# Patient Record
Sex: FEMALE | Race: WHITE | NOT HISPANIC OR LATINO | Employment: OTHER | URBAN - METROPOLITAN AREA
[De-identification: names, ages, dates, MRNs, and addresses within clinical notes are randomized per-mention and may not be internally consistent; named-entity substitution may affect disease eponyms.]

---

## 2021-11-30 ENCOUNTER — APPOINTMENT (OUTPATIENT)
Dept: RADIOLOGY | Facility: MEDICAL CENTER | Age: 86
DRG: 871 | End: 2021-11-30
Attending: EMERGENCY MEDICINE
Payer: MEDICARE

## 2021-11-30 ENCOUNTER — HOSPITAL ENCOUNTER (INPATIENT)
Facility: MEDICAL CENTER | Age: 86
LOS: 1 days | DRG: 871 | End: 2021-12-01
Attending: EMERGENCY MEDICINE | Admitting: HOSPITALIST
Payer: MEDICARE

## 2021-11-30 DIAGNOSIS — J18.9 PNEUMONIA OF RIGHT LOWER LOBE DUE TO INFECTIOUS ORGANISM: ICD-10-CM

## 2021-11-30 DIAGNOSIS — R53.1 GENERALIZED WEAKNESS: ICD-10-CM

## 2021-11-30 DIAGNOSIS — R50.9 FEVER, UNSPECIFIED FEVER CAUSE: ICD-10-CM

## 2021-11-30 DIAGNOSIS — R53.1 WEAKNESS: ICD-10-CM

## 2021-11-30 DIAGNOSIS — R05.9 COUGH: ICD-10-CM

## 2021-11-30 DIAGNOSIS — J12.82 PNEUMONIA DUE TO COVID-19 VIRUS: ICD-10-CM

## 2021-11-30 DIAGNOSIS — U07.1 PNEUMONIA DUE TO COVID-19 VIRUS: ICD-10-CM

## 2021-11-30 LAB
ALBUMIN SERPL BCP-MCNC: 3.7 G/DL (ref 3.2–4.9)
ALBUMIN/GLOB SERPL: 1.3 G/DL
ALP SERPL-CCNC: 118 U/L (ref 30–99)
ALT SERPL-CCNC: 13 U/L (ref 2–50)
ANION GAP SERPL CALC-SCNC: 12 MMOL/L (ref 7–16)
ANISOCYTOSIS BLD QL SMEAR: ABNORMAL
AST SERPL-CCNC: 26 U/L (ref 12–45)
BASOPHILS # BLD AUTO: 0 % (ref 0–1.8)
BASOPHILS # BLD: 0 K/UL (ref 0–0.12)
BILIRUB SERPL-MCNC: 1.5 MG/DL (ref 0.1–1.5)
BUN SERPL-MCNC: 15 MG/DL (ref 8–22)
CALCIUM SERPL-MCNC: 8.6 MG/DL (ref 8.4–10.2)
CHLORIDE SERPL-SCNC: 98 MMOL/L (ref 96–112)
CO2 SERPL-SCNC: 27 MMOL/L (ref 20–33)
CREAT SERPL-MCNC: 0.67 MG/DL (ref 0.5–1.4)
CRP SERPL HS-MCNC: 8.72 MG/DL (ref 0–0.75)
EOSINOPHIL # BLD AUTO: 0 K/UL (ref 0–0.51)
EOSINOPHIL NFR BLD: 0 % (ref 0–6.9)
ERYTHROCYTE [DISTWIDTH] IN BLOOD BY AUTOMATED COUNT: 69 FL (ref 35.9–50)
GLOBULIN SER CALC-MCNC: 2.9 G/DL (ref 1.9–3.5)
GLUCOSE SERPL-MCNC: 94 MG/DL (ref 65–99)
HCT VFR BLD AUTO: 31 % (ref 37–47)
HGB BLD-MCNC: 9.8 G/DL (ref 12–16)
LYMPHOCYTES # BLD AUTO: 0.37 K/UL (ref 1–4.8)
LYMPHOCYTES NFR BLD: 11 % (ref 22–41)
MACROCYTES BLD QL SMEAR: ABNORMAL
MANUAL DIFF BLD: NORMAL
MCH RBC QN AUTO: 32.1 PG (ref 27–33)
MCHC RBC AUTO-ENTMCNC: 31.6 G/DL (ref 33.6–35)
MCV RBC AUTO: 101.6 FL (ref 81.4–97.8)
MONOCYTES # BLD AUTO: 0.34 K/UL (ref 0–0.85)
MONOCYTES NFR BLD AUTO: 10 % (ref 0–13.4)
NEUTROPHILS # BLD AUTO: 2.69 K/UL (ref 2–7.15)
NEUTROPHILS NFR BLD: 74 % (ref 44–72)
NEUTS BAND NFR BLD MANUAL: 5 % (ref 0–10)
NRBC # BLD AUTO: 0 K/UL
NRBC BLD-RTO: 0 /100 WBC
PLATELET # BLD AUTO: 198 K/UL (ref 164–446)
PLATELET BLD QL SMEAR: NORMAL
PMV BLD AUTO: 9.9 FL (ref 9–12.9)
POTASSIUM SERPL-SCNC: 3.9 MMOL/L (ref 3.6–5.5)
PROCALCITONIN SERPL-MCNC: 0.12 NG/ML
PROT SERPL-MCNC: 6.6 G/DL (ref 6–8.2)
RBC # BLD AUTO: 3.05 M/UL (ref 4.2–5.4)
RBC BLD AUTO: PRESENT
SODIUM SERPL-SCNC: 137 MMOL/L (ref 135–145)
WBC # BLD AUTO: 3.4 K/UL (ref 4.8–10.8)

## 2021-11-30 PROCEDURE — 85007 BL SMEAR W/DIFF WBC COUNT: CPT

## 2021-11-30 PROCEDURE — 700102 HCHG RX REV CODE 250 W/ 637 OVERRIDE(OP): Performed by: EMERGENCY MEDICINE

## 2021-11-30 PROCEDURE — 85027 COMPLETE CBC AUTOMATED: CPT

## 2021-11-30 PROCEDURE — 99497 ADVNCD CARE PLAN 30 MIN: CPT | Performed by: HOSPITALIST

## 2021-11-30 PROCEDURE — 99223 1ST HOSP IP/OBS HIGH 75: CPT | Mod: AI,25 | Performed by: HOSPITALIST

## 2021-11-30 PROCEDURE — 71045 X-RAY EXAM CHEST 1 VIEW: CPT

## 2021-11-30 PROCEDURE — 86140 C-REACTIVE PROTEIN: CPT

## 2021-11-30 PROCEDURE — 99285 EMERGENCY DEPT VISIT HI MDM: CPT

## 2021-11-30 PROCEDURE — 306637 HCHG MISC ORTHO ITEM RC 0274

## 2021-11-30 PROCEDURE — 85379 FIBRIN DEGRADATION QUANT: CPT

## 2021-11-30 PROCEDURE — 700111 HCHG RX REV CODE 636 W/ 250 OVERRIDE (IP): Performed by: EMERGENCY MEDICINE

## 2021-11-30 PROCEDURE — 80053 COMPREHEN METABOLIC PANEL: CPT

## 2021-11-30 PROCEDURE — A9270 NON-COVERED ITEM OR SERVICE: HCPCS | Performed by: EMERGENCY MEDICINE

## 2021-11-30 PROCEDURE — 87040 BLOOD CULTURE FOR BACTERIA: CPT | Mod: 91

## 2021-11-30 PROCEDURE — 0241U HCHG SARS-COV-2 COVID-19 NFCT DS RESP RNA 4 TRGT MIC: CPT

## 2021-11-30 PROCEDURE — 84145 PROCALCITONIN (PCT): CPT

## 2021-11-30 PROCEDURE — C9803 HOPD COVID-19 SPEC COLLECT: HCPCS | Performed by: EMERGENCY MEDICINE

## 2021-11-30 PROCEDURE — 85610 PROTHROMBIN TIME: CPT

## 2021-11-30 RX ORDER — ONDANSETRON 2 MG/ML
4 INJECTION INTRAMUSCULAR; INTRAVENOUS EVERY 4 HOURS PRN
Status: DISCONTINUED | OUTPATIENT
Start: 2021-11-30 | End: 2021-12-01 | Stop reason: HOSPADM

## 2021-11-30 RX ORDER — ACETAMINOPHEN 325 MG/1
650 TABLET ORAL EVERY 6 HOURS PRN
Status: DISCONTINUED | OUTPATIENT
Start: 2021-11-30 | End: 2021-12-01 | Stop reason: HOSPADM

## 2021-11-30 RX ORDER — SODIUM CHLORIDE, SODIUM LACTATE, POTASSIUM CHLORIDE, AND CALCIUM CHLORIDE .6; .31; .03; .02 G/100ML; G/100ML; G/100ML; G/100ML
500 INJECTION, SOLUTION INTRAVENOUS
Status: DISCONTINUED | OUTPATIENT
Start: 2021-11-30 | End: 2021-12-01 | Stop reason: HOSPADM

## 2021-11-30 RX ORDER — GUAIFENESIN/DEXTROMETHORPHAN 100-10MG/5
10 SYRUP ORAL EVERY 6 HOURS PRN
Status: DISCONTINUED | OUTPATIENT
Start: 2021-11-30 | End: 2021-12-01 | Stop reason: HOSPADM

## 2021-11-30 RX ORDER — POLYETHYLENE GLYCOL 3350 17 G/17G
1 POWDER, FOR SOLUTION ORAL
Status: DISCONTINUED | OUTPATIENT
Start: 2021-11-30 | End: 2021-12-01 | Stop reason: HOSPADM

## 2021-11-30 RX ORDER — CEFTRIAXONE 1 G/1
1 INJECTION, POWDER, FOR SOLUTION INTRAMUSCULAR; INTRAVENOUS ONCE
Status: COMPLETED | OUTPATIENT
Start: 2021-11-30 | End: 2021-11-30

## 2021-11-30 RX ORDER — BISACODYL 10 MG
10 SUPPOSITORY, RECTAL RECTAL
Status: DISCONTINUED | OUTPATIENT
Start: 2021-11-30 | End: 2021-12-01 | Stop reason: HOSPADM

## 2021-11-30 RX ORDER — AMOXICILLIN 250 MG
2 CAPSULE ORAL 2 TIMES DAILY
Status: DISCONTINUED | OUTPATIENT
Start: 2021-12-01 | End: 2021-12-01 | Stop reason: HOSPADM

## 2021-11-30 RX ORDER — ONDANSETRON 4 MG/1
4 TABLET, ORALLY DISINTEGRATING ORAL EVERY 4 HOURS PRN
Status: DISCONTINUED | OUTPATIENT
Start: 2021-11-30 | End: 2021-12-01 | Stop reason: HOSPADM

## 2021-11-30 RX ORDER — ACETAMINOPHEN 500 MG
1000 TABLET ORAL ONCE
Status: COMPLETED | OUTPATIENT
Start: 2021-11-30 | End: 2021-11-30

## 2021-11-30 RX ADMIN — ACETAMINOPHEN 1000 MG: 500 TABLET ORAL at 22:13

## 2021-11-30 RX ADMIN — CEFTRIAXONE SODIUM 1 G: 1 INJECTION, POWDER, FOR SOLUTION INTRAMUSCULAR; INTRAVENOUS at 23:08

## 2021-12-01 ENCOUNTER — APPOINTMENT (OUTPATIENT)
Dept: RADIOLOGY | Facility: MEDICAL CENTER | Age: 86
DRG: 871 | End: 2021-12-01
Attending: HOSPITALIST
Payer: MEDICARE

## 2021-12-01 VITALS
TEMPERATURE: 98.1 F | DIASTOLIC BLOOD PRESSURE: 69 MMHG | WEIGHT: 103 LBS | SYSTOLIC BLOOD PRESSURE: 156 MMHG | BODY MASS INDEX: 18.95 KG/M2 | HEIGHT: 62 IN | RESPIRATION RATE: 22 BRPM | OXYGEN SATURATION: 97 % | HEART RATE: 63 BPM

## 2021-12-01 PROBLEM — H40.9 GLAUCOMA: Status: ACTIVE | Noted: 2021-12-01

## 2021-12-01 PROBLEM — U07.1 PNEUMONIA DUE TO COVID-19 VIRUS: Status: ACTIVE | Noted: 2021-12-01

## 2021-12-01 PROBLEM — J12.82 PNEUMONIA DUE TO COVID-19 VIRUS: Status: ACTIVE | Noted: 2021-12-01

## 2021-12-01 PROBLEM — U07.1 SEPSIS DUE TO COVID-19 (HCC): Status: ACTIVE | Noted: 2021-12-01

## 2021-12-01 PROBLEM — R09.02 HYPOXIA: Status: ACTIVE | Noted: 2021-12-01

## 2021-12-01 PROBLEM — R79.89 POSITIVE D DIMER: Status: ACTIVE | Noted: 2021-12-01

## 2021-12-01 PROBLEM — A41.89 SEPSIS DUE TO COVID-19 (HCC): Status: ACTIVE | Noted: 2021-12-01

## 2021-12-01 PROBLEM — E03.9 HYPOTHYROIDISM: Status: ACTIVE | Noted: 2021-12-01

## 2021-12-01 PROBLEM — D53.9 MACROCYTIC ANEMIA: Status: ACTIVE | Noted: 2021-12-01

## 2021-12-01 PROBLEM — Z71.89 ADVANCE CARE PLANNING: Status: ACTIVE | Noted: 2021-12-01

## 2021-12-01 PROBLEM — I10 HTN (HYPERTENSION): Status: ACTIVE | Noted: 2021-12-01

## 2021-12-01 PROBLEM — S72.009A RECENT FRACTURE OF HIP (HCC): Status: ACTIVE | Noted: 2021-12-01

## 2021-12-01 LAB
ANION GAP SERPL CALC-SCNC: 12 MMOL/L (ref 7–16)
BUN SERPL-MCNC: 13 MG/DL (ref 8–22)
CALCIUM SERPL-MCNC: 8 MG/DL (ref 8.4–10.2)
CHLORIDE SERPL-SCNC: 102 MMOL/L (ref 96–112)
CO2 SERPL-SCNC: 25 MMOL/L (ref 20–33)
CREAT SERPL-MCNC: 0.56 MG/DL (ref 0.5–1.4)
D DIMER PPP IA.FEU-MCNC: 4.87 UG/ML (FEU) (ref 0–0.5)
ERYTHROCYTE [DISTWIDTH] IN BLOOD BY AUTOMATED COUNT: 68.5 FL (ref 35.9–50)
FLUAV RNA SPEC QL NAA+PROBE: NEGATIVE
FLUBV RNA SPEC QL NAA+PROBE: NEGATIVE
GLUCOSE SERPL-MCNC: 97 MG/DL (ref 65–99)
HCT VFR BLD AUTO: 27 % (ref 37–47)
HGB BLD-MCNC: 8.4 G/DL (ref 12–16)
INR PPP: 1.16 (ref 0.87–1.13)
LACTATE BLD-SCNC: 1 MMOL/L (ref 0.5–2)
MCH RBC QN AUTO: 32.1 PG (ref 27–33)
MCHC RBC AUTO-ENTMCNC: 31.1 G/DL (ref 33.6–35)
MCV RBC AUTO: 103.1 FL (ref 81.4–97.8)
PLATELET # BLD AUTO: 175 K/UL (ref 164–446)
PMV BLD AUTO: 10.3 FL (ref 9–12.9)
POTASSIUM SERPL-SCNC: 3.9 MMOL/L (ref 3.6–5.5)
PROCALCITONIN SERPL-MCNC: 0.1 NG/ML
PROTHROMBIN TIME: 13.9 SEC (ref 12–14.6)
RBC # BLD AUTO: 2.62 M/UL (ref 4.2–5.4)
RSV RNA SPEC QL NAA+PROBE: NEGATIVE
SARS-COV-2 RNA RESP QL NAA+PROBE: DETECTED
SODIUM SERPL-SCNC: 139 MMOL/L (ref 135–145)
SPECIMEN SOURCE: ABNORMAL
WBC # BLD AUTO: 2.9 K/UL (ref 4.8–10.8)

## 2021-12-01 PROCEDURE — 700111 HCHG RX REV CODE 636 W/ 250 OVERRIDE (IP): Performed by: HOSPITALIST

## 2021-12-01 PROCEDURE — 83605 ASSAY OF LACTIC ACID: CPT

## 2021-12-01 PROCEDURE — 8E0ZXY6 ISOLATION: ICD-10-PCS | Performed by: HOSPITALIST

## 2021-12-01 PROCEDURE — 700101 HCHG RX REV CODE 250: Performed by: HOSPITALIST

## 2021-12-01 PROCEDURE — 700117 HCHG RX CONTRAST REV CODE 255: Performed by: HOSPITALIST

## 2021-12-01 PROCEDURE — 96372 THER/PROPH/DIAG INJ SC/IM: CPT | Mod: XU

## 2021-12-01 PROCEDURE — 96365 THER/PROPH/DIAG IV INF INIT: CPT | Mod: XU

## 2021-12-01 PROCEDURE — 99239 HOSP IP/OBS DSCHRG MGMT >30: CPT | Performed by: HOSPITALIST

## 2021-12-01 PROCEDURE — 85027 COMPLETE CBC AUTOMATED: CPT

## 2021-12-01 PROCEDURE — 71275 CT ANGIOGRAPHY CHEST: CPT | Mod: MC

## 2021-12-01 PROCEDURE — 36415 COLL VENOUS BLD VENIPUNCTURE: CPT

## 2021-12-01 PROCEDURE — A9270 NON-COVERED ITEM OR SERVICE: HCPCS | Performed by: HOSPITALIST

## 2021-12-01 PROCEDURE — 80048 BASIC METABOLIC PNL TOTAL CA: CPT

## 2021-12-01 PROCEDURE — 96375 TX/PRO/DX INJ NEW DRUG ADDON: CPT | Mod: XU

## 2021-12-01 PROCEDURE — 700102 HCHG RX REV CODE 250 W/ 637 OVERRIDE(OP): Performed by: HOSPITALIST

## 2021-12-01 PROCEDURE — 700105 HCHG RX REV CODE 258: Performed by: HOSPITALIST

## 2021-12-01 PROCEDURE — 84145 PROCALCITONIN (PCT): CPT

## 2021-12-01 RX ORDER — LISINOPRIL 20 MG/1
20 TABLET ORAL DAILY
COMMUNITY

## 2021-12-01 RX ORDER — DEXAMETHASONE SODIUM PHOSPHATE 4 MG/ML
6 INJECTION, SOLUTION INTRA-ARTICULAR; INTRALESIONAL; INTRAMUSCULAR; INTRAVENOUS; SOFT TISSUE DAILY
Status: DISCONTINUED | OUTPATIENT
Start: 2021-12-01 | End: 2021-12-01 | Stop reason: HOSPADM

## 2021-12-01 RX ORDER — DEXAMETHASONE 6 MG/1
6 TABLET ORAL DAILY
Qty: 30 TABLET | Status: SHIPPED | OUTPATIENT
Start: 2021-12-01 | End: 2021-12-10

## 2021-12-01 RX ORDER — ONDANSETRON 4 MG/1
4 TABLET, ORALLY DISINTEGRATING ORAL EVERY 6 HOURS PRN
COMMUNITY

## 2021-12-01 RX ORDER — CALCIUM CARBONATE 500 MG/1
500 TABLET, CHEWABLE ORAL DAILY
Status: DISCONTINUED | OUTPATIENT
Start: 2021-12-01 | End: 2021-12-01 | Stop reason: HOSPADM

## 2021-12-01 RX ORDER — TIMOLOL MALEATE 5 MG/ML
1 SOLUTION/ DROPS OPHTHALMIC 2 TIMES DAILY
Status: DISCONTINUED | OUTPATIENT
Start: 2021-12-01 | End: 2021-12-01 | Stop reason: HOSPADM

## 2021-12-01 RX ORDER — UREA 10 %
800 LOTION (ML) TOPICAL DAILY
COMMUNITY

## 2021-12-01 RX ORDER — OXYCODONE HYDROCHLORIDE 5 MG/1
5 TABLET ORAL EVERY 6 HOURS PRN
Status: DISCONTINUED | OUTPATIENT
Start: 2021-12-01 | End: 2021-12-01 | Stop reason: HOSPADM

## 2021-12-01 RX ORDER — ASPIRIN 81 MG/1
81 TABLET, CHEWABLE ORAL DAILY
Status: DISCONTINUED | OUTPATIENT
Start: 2021-12-01 | End: 2021-12-01 | Stop reason: HOSPADM

## 2021-12-01 RX ORDER — M-VIT,TX,IRON,MINS/CALC/FOLIC 27MG-0.4MG
1 TABLET ORAL DAILY
Status: SHIPPED | COMMUNITY
End: 2021-12-01

## 2021-12-01 RX ORDER — ROPINIROLE 0.5 MG/1
0.5 TABLET, FILM COATED ORAL
COMMUNITY

## 2021-12-01 RX ORDER — CELECOXIB 200 MG/1
200 CAPSULE ORAL DAILY
COMMUNITY

## 2021-12-01 RX ORDER — TRAMADOL HYDROCHLORIDE 50 MG/1
50 TABLET ORAL EVERY 6 HOURS PRN
COMMUNITY

## 2021-12-01 RX ORDER — LATANOPROST 50 UG/ML
1 SOLUTION/ DROPS OPHTHALMIC EVERY EVENING
Status: DISCONTINUED | OUTPATIENT
Start: 2021-12-01 | End: 2021-12-01 | Stop reason: HOSPADM

## 2021-12-01 RX ORDER — OXYCODONE HYDROCHLORIDE 5 MG/1
5 TABLET ORAL EVERY 6 HOURS PRN
COMMUNITY

## 2021-12-01 RX ORDER — VITAMIN B COMPLEX
1000 TABLET ORAL DAILY
Status: DISCONTINUED | OUTPATIENT
Start: 2021-12-01 | End: 2021-12-01 | Stop reason: HOSPADM

## 2021-12-01 RX ORDER — AMOXICILLIN AND CLAVULANATE POTASSIUM 875; 125 MG/1; MG/1
1 TABLET, FILM COATED ORAL 2 TIMES DAILY
Status: SHIPPED | COMMUNITY
End: 2021-12-01

## 2021-12-01 RX ORDER — OXYCODONE HCL 10 MG/1
5 TABLET, FILM COATED, EXTENDED RELEASE ORAL EVERY 6 HOURS PRN
Status: SHIPPED | COMMUNITY
End: 2021-12-01

## 2021-12-01 RX ORDER — ENEMA 19; 7 G/133ML; G/133ML
1 ENEMA RECTAL PRN
Status: SHIPPED | COMMUNITY
End: 2021-12-01

## 2021-12-01 RX ORDER — BISACODYL 10 MG
10 SUPPOSITORY, RECTAL RECTAL DAILY
Status: SHIPPED | COMMUNITY
End: 2021-12-01

## 2021-12-01 RX ORDER — ZINC SULFATE 50(220)MG
220 CAPSULE ORAL DAILY
Qty: 30 CAPSULE | Refills: 3 | Status: SHIPPED | OUTPATIENT
Start: 2021-12-01 | End: 2021-12-11

## 2021-12-01 RX ORDER — LEVOTHYROXINE SODIUM 0.1 MG/1
100 TABLET ORAL
COMMUNITY

## 2021-12-01 RX ORDER — LATANOPROST 50 UG/ML
1 SOLUTION/ DROPS OPHTHALMIC NIGHTLY
COMMUNITY

## 2021-12-01 RX ORDER — LISINOPRIL 20 MG/1
20 TABLET ORAL
Status: DISCONTINUED | OUTPATIENT
Start: 2021-12-01 | End: 2021-12-01 | Stop reason: HOSPADM

## 2021-12-01 RX ORDER — TIMOLOL MALEATE 5 MG/ML
1 SOLUTION/ DROPS OPHTHALMIC 2 TIMES DAILY
COMMUNITY

## 2021-12-01 RX ORDER — CELECOXIB 200 MG/1
200 CAPSULE ORAL DAILY
Status: DISCONTINUED | OUTPATIENT
Start: 2021-12-01 | End: 2021-12-01 | Stop reason: HOSPADM

## 2021-12-01 RX ORDER — VITAMIN B COMPLEX
1000 TABLET ORAL DAILY
COMMUNITY

## 2021-12-01 RX ORDER — ACETAMINOPHEN 325 MG/1
650 TABLET ORAL EVERY 4 HOURS PRN
COMMUNITY

## 2021-12-01 RX ORDER — LEVOTHYROXINE SODIUM 0.05 MG/1
100 TABLET ORAL
Status: DISCONTINUED | OUTPATIENT
Start: 2021-12-01 | End: 2021-12-01 | Stop reason: HOSPADM

## 2021-12-01 RX ADMIN — ENOXAPARIN SODIUM 30 MG: 30 INJECTION SUBCUTANEOUS at 05:56

## 2021-12-01 RX ADMIN — SODIUM CHLORIDE 3 G: 900 INJECTION INTRAVENOUS at 05:47

## 2021-12-01 RX ADMIN — DEXAMETHASONE SODIUM PHOSPHATE 6 MG: 4 INJECTION, SOLUTION INTRA-ARTICULAR; INTRALESIONAL; INTRAMUSCULAR; INTRAVENOUS; SOFT TISSUE at 05:44

## 2021-12-01 RX ADMIN — IOHEXOL 60 ML: 350 INJECTION, SOLUTION INTRAVENOUS at 06:47

## 2021-12-01 RX ADMIN — TIMOLOL MALEATE 1 DROP: 5 SOLUTION OPHTHALMIC at 05:49

## 2021-12-01 RX ADMIN — LISINOPRIL 20 MG: 20 TABLET ORAL at 05:54

## 2021-12-01 RX ADMIN — Medication 1000 UNITS: at 05:52

## 2021-12-01 RX ADMIN — SODIUM CHLORIDE 3 G: 900 INJECTION INTRAVENOUS at 01:41

## 2021-12-01 RX ADMIN — LEVOTHYROXINE SODIUM 100 MCG: 0.05 TABLET ORAL at 05:52

## 2021-12-01 RX ADMIN — CALCIUM CARBONATE (ANTACID) CHEW TAB 500 MG 500 MG: 500 CHEW TAB at 05:50

## 2021-12-01 RX ADMIN — CELECOXIB 200 MG: 200 CAPSULE ORAL at 05:52

## 2021-12-01 RX ADMIN — ASPIRIN 81 MG CHEWABLE TABLET 81 MG: 81 TABLET CHEWABLE at 05:50

## 2021-12-01 ASSESSMENT — ENCOUNTER SYMPTOMS
COUGH: 1
FEVER: 1
SHORTNESS OF BREATH: 0
SORE THROAT: 0
PALPITATIONS: 0
MYALGIAS: 0
NECK PAIN: 0
BLURRED VISION: 0
NAUSEA: 0
DEPRESSION: 0
DIZZINESS: 0
HEADACHES: 0
INSOMNIA: 0
DOUBLE VISION: 0
VOMITING: 0
BRUISES/BLEEDS EASILY: 0
WEAKNESS: 0

## 2021-12-01 NOTE — ASSESSMENT & PLAN NOTE
-I personally called her niece, . Mariya Brothers ( [phone]), who lives in [place], for updates and also to discussed advanced directives and CODE STATUS.   -She mentions that at baseline, patient should be able to make her own decisions as she was still writing emails most every day up until her surgery, however unable to do so once hospitalized and away from her computer and iPad.  Patient has severe hearing impairment which makes it harder to communicate over the phone.  -I explained her that patient is unable to have a full conversation on advanced directives at the time of admission.  I believe she has a better chance to come back to her baseline mentation during the day and will give it a chance for her to address this.  -Her niece mentions that she always try to avoid talking about advanced directives and CODE STATUS in the past however she does believe that if Covid get much worse that she would not like to be intubated or resuscitated.  -Decided to keep her as a full code given that she had a recent hip surgery and her CODE STATUS at outside facility was a full code.  -She will need a family meeting to address in depth advanced directive if mentation is improved during the day.  -Her niece Mariya is also asking if possible to coordinate a call via iPad or [employer] given that she was unable to see her since her hip surgery due to lack of technology in other facilities.      Total amount of time spent on advance care planning and coordination of care was 27 minutes.

## 2021-12-01 NOTE — DISCHARGE PLANNING
Voalte to Dr. Santos, pt is stable and safe for transfer [facility]. Call placed to [facility], per Anabel, they are able to take pt back.   Requesting an updated referral be sent through [email or web address]. msg to [facility] for updated referral to be sent.     STEFFANY PCS completed and faxed with facesheet.  1110-Cobra completed and left on chart in ER. Update BSRN that patient needs to sign and to call report prior to leaving.   1120- call to DORETHASA per CHAI Fox 1200 update to [facility]

## 2021-12-01 NOTE — ED NOTES
"Attempted to get report from [facility], put on hold for 5-min and wasn't able to speak to anyone as to \"why\" Pt came to hospital;  "

## 2021-12-01 NOTE — ASSESSMENT & PLAN NOTE
-Positive COVID test: In the ED, however also positive for Covid at [facility] 2 days ago.   -Patient is requiring supplemental oxygen: 2 L  -Patient started on dexamethasone 6 mg IV in the ED that will be continued.  -Other inflammatory markers: D-dimer was 4.87, CRP 8.72 and procalcitonin 0.12.

## 2021-12-01 NOTE — DISCHARGE PLANNING
Agency/Facility Name: [facility]  Spoke To: Bethany  Outcome: If pt is stable, facility can take back.      CM informed

## 2021-12-01 NOTE — ED PROVIDER NOTES
ED Provider Note    CHIEF COMPLAINT  Weakness, fever, shortness of breath, cough    HPI  Patient is 89-year-old female who presents emergency room brought in by ambulance personnel from [facility] for evaluation of several symptoms.  She apparently had a recent surgical procedure and had a diagnosis of COVID.  She is pleasant but a somewhat poor historian regarding recent timeline of events but paperwork that came from the outside facility is even more nebulous and confusing.  EMS personnel were told that she was not feeling well and that she had a Covid diagnosis with a worsening cough and fatigue.  There is no reported hypoxia and on arrival the patient is febrile and slightly hypertensive.  She is complaining of body aches and chills, she is complaining of a cough, he is not having any active chest pain but does endorse some shortness of breath.  She is on 2 L of oxygen and the timing of this is unknown.    PPE Note: I personally donned full PPE for all patient encounters during this visit, including being clean-shaven with an N95 respirator mask, gloves, and goggles.     REVIEW OF SYSTEMS  Constitutional: + fevers chills and recent illness.  Skin: No rashes or diaphoresis.  Eyes: No change in vision, no discharge.  ENT: No hearing change. No rhinorrhea or nasal congestion, no ST or difficulty swallowing.  Respiratory: + SOB. + coughing without hemoptysis. No Wheezing.  Cardiac: No CP, palpitations, edema. No PND or orthopnea.  GI: No Abdominal Pain; N/V; diarrhea, constipation. No blood in stool.  MSK: No pain in joints or muscles.   Neuro: No HA or paresthesias. Global weakness  Endocrine: No polyuria or polydipsia. No heat or cold intolerance.  Heme: No easy bruising. No history of bleeding disorders or anemia.    PAST MEDICAL HISTORY   COVID, hip fracture    SOCIAL HISTORY  Social History     Tobacco Use   • Smoking status: Not on file   • Smokeless tobacco: Not on file   Substance and Sexual  "Activity   • Alcohol use: Not on file   • Drug use: Not on file   • Sexual activity: Not on file     SURGICAL HISTORY  patient denies any surgical history    CURRENT MEDICATIONS  Home Medications    **Home medications have not yet been reviewed for this encounter**       ALLERGIES  No Known Allergies    PHYSICAL EXAM  VITAL SIGNS: /85   Pulse 82   Temp (!) 38.2 °C (100.7 °F) (Temporal)   Resp 20   Ht 1.575 m (5' 2\")   Wt 46.7 kg (103 lb)   SpO2 97%   BMI 18.84 kg/m²   Pulse ox interpretation: I interpret this pulse ox as normal.  Genl: elderly F sitting in chair comfortably, speaking in short sentences.  Productive coughy, appears in mild distress   Head: NC/AT   ENT: Mucous membranes very dry, posterior pharynx clear, uvula midline, nares patent bilaterally  Eyes: Normal sclera, pupils equal round reactive to light  Neck: Supple, FROM, no LAD appreciated  Pulmonary: Lungs are bilaterally diminished throughout  Chest: No TTP  CV:  RRR, no murmur appreciated, pulses 2+ in both upper and lower extremities,  Abdomen: soft, NT/ND; no rebound/guarding, no masses palpated, no HSM   : no CVA or suprapubic tenderness   Musculoskeletal: Pain free ROM of the neck. Moving upper and lower extremities and spontaneous in coordinated fashion  Neuro: A&Ox4 (person, place, time, situation), speech fluent, gait not assessed, no focal deficits appreciated  Psych: Patient has an appropriate affect and behavior  Skin: No rash or lesions.  No pallor or jaundice.  No cyanosis.  Warm and dry.     DIAGNOSTIC STUDIES / PROCEDURES    LABS  Labs Reviewed   CBC WITH DIFFERENTIAL - Abnormal; Notable for the following components:       Result Value    WBC 3.4 (*)     RBC 3.05 (*)     Hemoglobin 9.8 (*)     Hematocrit 31.0 (*)     .6 (*)     MCHC 31.6 (*)     RDW 69.0 (*)     Neutrophils-Polys 74.00 (*)     Lymphocytes 11.00 (*)     Lymphs (Absolute) 0.37 (*)     All other components within normal limits   COMP METABOLIC " "PANEL - Abnormal; Notable for the following components:    Alkaline Phosphatase 118 (*)     All other components within normal limits   CRP QUANTITIVE (NON-CARDIAC) - Abnormal; Notable for the following components:    Stat C-Reactive Protein 8.72 (*)     All other components within normal limits   BLOOD CULTURE    Narrative:     Per Hospital Policy: Only change Specimen Src: to \"Line\" if  specified by physician order.   BLOOD CULTURE    Narrative:     Per Hospital Policy: Only change Specimen Src: to \"Line\" if  specified by physician order.   PROCALCITONIN   DIFFERENTIAL MANUAL   PLATELET ESTIMATE   MORPHOLOGY   COV-2, FLU A/B, AND RSV BY PCR    Narrative:     Have you been in close contact with a person who is suspected  or known to be positive for COVID-19 within the last 30 days  (e.g. last seen that person < 30 days ago)->Yes   ESTIMATED GFR   URINALYSIS     RADIOLOGY  DX-CHEST-PORTABLE (1 VIEW)   Final Result         1.  Hazy right lower lobe infiltrates   2.  Atherosclerosis        COURSE & MEDICAL DECISION MAKING  Pertinent Labs & Imaging studies reviewed. (See chart for details)    DDX:  Covid, pneumonia, UTI, sepsis, viral syndrome, dehydration, kidney injury, electrolyte derangement    MDM    Initial evaluation at 2123:  Patient presents emergency room for symptoms as described above.  The patient was sent to the hospital for presumed infectious source.  It is thought that the patient had Covid though I do not have paperwork to confirm this.  And the patient is a poor historian, she is having a productive cough, she is febrile and tachypneic.  Lab work obtained shows leukopenia, macrocytic anemia that appears acute on chronic with elevated CRP.  Chest x-ray shows right lower lobe hazy findings consistent with pneumonia.  Concern is for Covid, bacterial infection superimposed.  Patient has elevated CRP, urinalysis is yet to be collected with a concern raised for any possible urinary tract infection.  " Initial dose of empiric antibiotics is given.  Fluid resuscitation is judiciously done as the patient is somewhat fragile, has underlying lung injury and may not tolerate weight-based fluid resuscitation per sepsis protocol if she has underlying Covid diagnosis.  She received Tylenol for her fever.  She is resting comfortably in the bed at this time I have consulted the hospitalist to admit for her sepsis of respiratory origin.    HYDRATION: Based on the patient's presentation of Sepsis the patient was given IV fluids. IV Hydration was used because oral hydration was not adequate alone. Upon recheck following hydration, the patient was improved.    CRITICAL CARE:  I saw and evaluated this patient. I personally provided 28 minutes of critical care time to the patient excluding billable procedures and directly and personally provided the following treatment and critical care management:  Critical Care Interventions  Multiple bedside assessments, coordination of care with family and other historical sources, Continuous hemodynamic and respiratory monitoring, Serial airway observations and Fluid resuscitation    FINAL IMPRESSION  Visit Diagnoses     ICD-10-CM   1. Weakness  R53.1   2. Fever, unspecified fever cause  R50.9   3. Generalized weakness  R53.1   4. Cough  R05.9   5. Pneumonia of right lower lobe due to infectious organism  J18.9     Electronically signed by: Cipriano Wray M.D., 11/30/2021 9:24 PM

## 2021-12-01 NOTE — ED TRIAGE NOTES
Pt bib [facility] from [facility] c/o weakness/flu like symptoms. Pt was tested positive for COVID at living facility.

## 2021-12-01 NOTE — ED NOTES
Pt report called to [facility]. Report given to STEFFANY by LU Sims

## 2021-12-01 NOTE — DISCHARGE PLANNING
Received Choice form at 1125  Agency/Facility Name: [facility]  Referral sent per Choice form @ 1125      CM informed

## 2021-12-01 NOTE — ED NOTES
New warm blankets placed. Pt given water sponge to moisten mouth. Pt confused and is reoriented. Denies any needs at this time.

## 2021-12-01 NOTE — H&P
"Hospital Medicine History & Physical Note    Date of Service  11/30/2021    Primary Care Physician  Santo Correa M.D.    Consultants  None    Code Status  Full Code    Chief Complaint  Chief Complaint   Patient presents with   • Weakness     Pt BIB [facility]; Pt stated \"I want to get better\" - unable to explain exactly what she's referring to at this time; EMS reported that Pt was recently Dx c COVID and believes that she just doesn't want to stay at [facility] [place];       History of Presenting Illness    Hx was obtained over the phone with her Niece Khanh Barber is a 89 y.o. female, with h/o HTN, Hypothyroidism, Glaucoma, who had a left hip surgery after fracture on 11/7/2021 at [place], [place].  She lives at assisted living place at [facility] in [place] but given need of postoperative rehab, was admitted to [facility] in [place]. She is fully vaccinated for COVID with  [employer], but unfortunately tested positive for the disease at outside facility 2 days ago.  Niece reports that she was adamant about staying in the facility where she could get her physical therapy going however, given worsening of her cough, fever, shortness of breath and overall weakness, she was sent to the  ER via EMS on 11/30/2021 for evaluation.      Per niece, she is very independent, still communicates most every day via email with the niece, considering that she has significant hearing impairment.  She is an  and practice most of her life in [place] before residential.  She was a still living alone and driving up until 5 years ago at age of 89 when she broke her other hip and finally had to sell her house and move into an assisted living place.  She walks with a walker but otherwise still very independent at baseline before current hip surgery earlier in November.    At the time of admission, patient is not able to give me full history.  She would state many times \"I just want everything to be okay " "\".  She was able to answer simple questions including that she had a son who unfortunately  in the past and she has not a lot of family around.    Patient was febrile on arrival, temperature 100.7, tachypneic with initial blood pressure elevated 175/78.  Rapid Covid test in the ED was positive.  CRP is elevated at 8.72, procalcitonin 0.12 and D-dimer 4.87.  Chest x-ray showing right side pneumonia.    I discussed the plan of care with patient.    Review of Systems  Review of Systems   Constitutional: Positive for fever and malaise/fatigue.   HENT: Negative for congestion and sore throat.    Eyes: Negative for blurred vision and double vision.   Respiratory: Positive for cough. Negative for shortness of breath.    Cardiovascular: Negative for chest pain and palpitations.   Gastrointestinal: Negative for nausea and vomiting.   Genitourinary: Negative for dysuria and urgency.   Musculoskeletal: Negative for myalgias and neck pain.   Skin: Negative for itching and rash.   Neurological: Negative for dizziness, weakness and headaches.   Endo/Heme/Allergies: Does not bruise/bleed easily.   Psychiatric/Behavioral: Negative for depression. The patient does not have insomnia.        Past Medical History  HTN, Hypothyroidism and Glaucoma    Surgical History  Recent left hip surgery    Family History  Unable to obtain        Social History   Patient denies a smoking and alcohol use.    Allergies  No Known Allergies    Medications  None       Physical Exam  Temp:  [38.2 °C (100.7 °F)] 38.2 °C (100.7 °F)  Pulse:  [72-84] 72  Resp:  [20-26] 21  BP: (130-180)/(57-85) 130/57  SpO2:  [97 %-99 %] 99 %  Blood Pressure : 130/57   Temperature: (!) 38.2 °C (100.7 °F)   Pulse: 72   Respiration: (!) 21   Pulse Oximetry: 99 %       Physical Exam  Constitutional:       Appearance: Normal appearance.      Comments: Looks uncomfortable   HENT:      Head: Normocephalic and atraumatic.      Mouth/Throat:      Mouth: Mucous membranes are " moist.      Pharynx: Oropharynx is clear.   Eyes:      Extraocular Movements: Extraocular movements intact.      Pupils: Pupils are equal, round, and reactive to light.   Cardiovascular:      Rate and Rhythm: Normal rate and regular rhythm.      Heart sounds: Normal heart sounds.   Pulmonary:      Effort: Pulmonary effort is normal.      Breath sounds: Rhonchi present. No wheezing or rales.   Abdominal:      General: Abdomen is flat. Bowel sounds are normal.      Palpations: Abdomen is soft.   Musculoskeletal:      Cervical back: Normal range of motion and neck supple.      Comments: Left hip: Small incision with dressing in place on the left hip, no oozing, no erythema   Skin:     General: Skin is warm and dry.   Neurological:      General: No focal deficit present.      Mental Status: She is alert.      Comments: Oriented to self only.   Psychiatric:         Mood and Affect: Mood normal.         Behavior: Behavior normal.         Laboratory:  Recent Labs     11/30/21 2203   WBC 3.4*   RBC 3.05*   HEMOGLOBIN 9.8*   HEMATOCRIT 31.0*   .6*   MCH 32.1   MCHC 31.6*   RDW 69.0*   PLATELETCT 198   MPV 9.9     Recent Labs     11/30/21  2203   SODIUM 137   POTASSIUM 3.9   CHLORIDE 98   CO2 27   GLUCOSE 94   BUN 15   CREATININE 0.67   CALCIUM 8.6     Recent Labs     11/30/21  2203   ALTSGPT 13   ASTSGOT 26   ALKPHOSPHAT 118*   TBILIRUBIN 1.5   GLUCOSE 94         No results for input(s): NTPROBNP in the last 72 hours.      No results for input(s): TROPONINT in the last 72 hours.    Imaging:  DX-CHEST-PORTABLE (1 VIEW)   Final Result         1.  Hazy right lower lobe infiltrates   2.  Atherosclerosis          X-Ray:  I have personally reviewed the images and compared with prior images.    Assessment/Plan:  I anticipate this patient will require at least two midnights for appropriate medical management, necessitating inpatient admission.    * Sepsis due to COVID-19 (HCC)  Assessment & Plan  Inpatient status on medical  floor.  This is Sepsis Present on admission  SIRS criteria identified on my evaluation include: Tachypnea, with respirations greater than 20 per minute and Leukopenia, with WBC less than 4,000  Source is COVID Pneumonia  Sepsis protocol initiated  Fluid resuscitation restricted given positive COVID test  IV antibiotics as appropriate for source of sepsis: Given empiric antibiotic coverage on admission.  She has a CT angiogram of the chest pending.  Noticed that procalcitonin is within normal limits, will follow up on imaging to decide if continuing antibiotics, considering that her chest x-ray showing more right lower lobe infiltrate concerning for pneumonia, possible component of aspiration  While organ dysfunction may be noted elsewhere in this problem list or in the chart, degree of organ dysfunction does not meet CMS criteria for severe sepsis          Pneumonia due to COVID-19 virus  Assessment & Plan  -Positive COVID test: In the ED, however also positive for Covid at [facility] 2 days ago.   -Patient is requiring supplemental oxygen: 2 L  -Patient started on dexamethasone 6 mg IV in the ED that will be continued.  -Other inflammatory markers: D-dimer was 4.87, CRP 8.72 and procalcitonin 0.12.      Recent fracture of hip (HCC)  Assessment & Plan  -Left hip fracture on 11/7/2021 status post hip surgical repair over at [facility].  She was admitted to [facility] in [place], [place], where she has been rehabbing postoperatively.  -Order PT/OT in the morning.  -Pain control with oxycodone ordered.  -She does have history of right hip fracture status post repair 5 years ago.  -At baseline walks with a walker.  -Very independent was living alone at her house up until age of 89 and driving.  She now lives at [facility] in [facility].    Positive D dimer  Assessment & Plan  -D-dimer on admission is 4.87.  Added CT angiogram of the chest.    Hypoxia  Assessment &  Plan  -Currently on 2 L of supplemental oxygen to maintain O2 saturations above 90%.  Plan as above.    Hypothyroidism  Assessment & Plan  -Levothyroxine.    HTN (hypertension)  Assessment & Plan  -Continue Lisinopril    Advance care planning  Assessment & Plan  -I personally called her niece, . Mariya Brothers ( [phone]), who lives in [place], for updates and also to discussed advanced directives and CODE STATUS.   -She mentions that at baseline, patient should be able to make her own decisions as she was still writing emails most every day up until her surgery, however unable to do so once hospitalized and away from her computer and iPad.  Patient has severe hearing impairment which makes it harder to communicate over the phone.  -I explained her that patient is unable to have a full conversation on advanced directives at the time of admission.  I believe she has a better chance to come back to her baseline mentation during the day and will give it a chance for her to address this.  -Her niece mentions that she always try to avoid talking about advanced directives and CODE STATUS in the past however she does believe that if Covid get much worse that she would not like to be intubated or resuscitated.  -Decided to keep her as a full code given that she had a recent hip surgery and her CODE STATUS at outside facility was a full code.  -She will need a family meeting to address in depth advanced directive if mentation is improved during the day.  -Her niece Mariya is also asking if possible to coordinate a call via iPad or [employer] given that she was unable to see her since her hip surgery due to lack of technology in other facilities.      Total amount of time spent on advance care planning and coordination of care was 27 minutes.    Glaucoma  Assessment & Plan  -Continue latanoprost and timolol.    Macrocytic anemia  Assessment & Plan  -On admission hemoglobin is 9.8 with MCV of 101.6.  -Monitor CBC in the  morning.      VTE prophylaxis: SCDs/TEDs and enoxaparin ppx

## 2021-12-01 NOTE — ED NOTES
Med rec updated and complete  Allergies reviewed  Received MAR from [facility]    No current facility-administered medications on file prior to encounter.     Current Outpatient Medications on File Prior to Encounter   Medication Sig Dispense Refill   • aspirin EC (ECOTRIN) 81 MG Tablet Delayed Response Take 81 mg by mouth every day.     • acetaminophen (TYLENOL) 325 MG Tab Take 650 mg by mouth every four hours as needed for Fever (Temp >101).     • Calcium Carbonate (CALCIUM 500 PO) Take 1,000 mg by mouth every day.     • celecoxib (CELEBREX) 200 MG Cap Take 200 mg by mouth every day.     • vitamin D3 (CHOLECALCIFEROL) 1000 Unit (25 mcg) Tab Take 1,000 Units by mouth every day.     • folic acid (FOLVITE) 800 MCG tablet Take 800 mcg by mouth every day.     • latanoprost (XALATAN) 0.005 % Solution Administer 1 Drop into both eyes every evening. Indications: Wide-Angle Glaucoma     • levothyroxine (SYNTHROID) 100 MCG Tab Take 100 mcg by mouth every morning on an empty stomach.     • lisinopril (PRINIVIL) 20 MG Tab Take 20 mg by mouth every day. Indications: High Blood Pressure Disorder     • ondansetron (ZOFRAN ODT) 4 MG TABLET DISPERSIBLE Take 4 mg by mouth every 6 hours as needed for Nausea. Indications: Nausea and Vomiting     • ROPINIRole (REQUIP) 0.5 MG Tab Take 0.5 mg by mouth at bedtime. Indications: Restless Leg Syndrome     • timolol (TIMOPTIC) 0.5 % Solution Administer 1 Drop into both eyes 2 times a day.     • traMADol (ULTRAM) 50 MG Tab Take 50 mg by mouth every 6 hours as needed for Moderate Pain.     • amoxicillin-clavulanate (AUGMENTIN) 875-125 MG Tab Take 1 Tablet by mouth 2 times a day. Per MAR pt started on 11/15/2021 for 5 day course     • Lactobacillus Rhamnosus, GG, (CULTURELLE PO) Take 1 Capsule by mouth every day.     • multivitamin (THERAGRAN) Tab Take 1 Tablet by mouth every day.     • oxyCODONE immediate-release (ROXICODONE) 5 MG Tab Take 5 mg by mouth every 6 hours as needed  for Severe Pain.

## 2021-12-01 NOTE — DISCHARGE SUMMARY
"Discharge Summary    CHIEF COMPLAINT ON ADMISSION  Chief Complaint   Patient presents with   • Weakness     Pt BIB [facility]; Pt stated \"I want to get better\" - unable to explain exactly what she's referring to at this time; EMS reported that Pt was recently Dx c COVID and believes that she just doesn't want to stay at [facility];       Reason for Admission  EMS     Admission Date  11/30/2021    CODE STATUS  Full Code    HPI & HOSPITAL COURSE  Ms. Anne Barber is 89-year-old female comes in because of shortness of breath and some confusion.  The patient was brought over from [facility] and was admitted by my partner Dr. Francisca Cabrera because of shortness of breath and found to be positive for COVID-19 at the facility.  The patient was admitted and continued on oxygen support.  Patient remains pleasantly confused and very hard of hearing.  Currently patient has no complaints and has been on 1 L of oxygen by nasal cannula.  She has been started on Decadron as well as zinc vitamin C and vitamin D.  [facility] facility at this point has opened up a section for the COVID-19 infected patients and is able to take patient back.  Patient will be transported back to continue oxygen support and medication management.  Patient currently is cleared for discharge.      Therefore, she is discharged in fair and stable condition to skilled nursing facility.    The patient recovered much more quickly than anticipated on admission.    Discharge Date  12/1/2021    FOLLOW UP ITEMS POST DISCHARGE  Follow with the primary care physician in 7 to 10 days    DISCHARGE DIAGNOSES  Principal Problem:    Sepsis due to COVID-19 (HCC) POA: Unknown  Active Problems:    Pneumonia due to COVID-19 virus POA: Unknown    Hypoxia POA: Unknown    Macrocytic anemia POA: Unknown    Positive D dimer POA: Unknown    HTN (hypertension) POA: Unknown    Glaucoma POA: Unknown    Hypothyroidism POA: Unknown    Recent fracture of hip (HCC) " POA: Unknown    Advance care planning POA: Unknown  Resolved Problems:    * No resolved hospital problems. *      FOLLOW UP  No future appointments.  No follow-up provider specified.    MEDICATIONS ON DISCHARGE     Medication List      START taking these medications      Instructions   dexamethasone 6 MG Tabs  Commonly known as: Decadron   Take 1 Tablet by mouth every day for 9 days.  Dose: 6 mg     enoxaparin 30 MG/0.3ML Soln inj  Start taking on: December 2, 2021  Commonly known as: LOVENOX   Inject 0.3 mL under the skin every day.  Dose: 30 mg     vitamin C 500 MG Chew   Chew 2 Tablets every day for 10 days.  Dose: 1,000 mg     zinc sulfate 220 (50 Zn) MG Caps  Commonly known as: ZINCATE   Take 1 Capsule by mouth every day for 10 days.  Dose: 220 mg        CONTINUE taking these medications      Instructions   acetaminophen 325 MG Tabs  Commonly known as: Tylenol   Take 650 mg by mouth every four hours as needed for Fever (Temp >101).  Dose: 650 mg     aspirin EC 81 MG Tbec  Commonly known as: ECOTRIN   Take 81 mg by mouth every day.  Dose: 81 mg     CALCIUM 500 PO   Take 1,000 mg by mouth every day.  Dose: 1,000 mg     celecoxib 200 MG Caps  Commonly known as: CELEBREX   Take 200 mg by mouth every day.  Dose: 200 mg     CULTURELLE PO   Take 1 Capsule by mouth every day.  Dose: 1 Capsule     folic acid 800 MCG tablet  Commonly known as: FOLVITE   Take 800 mcg by mouth every day.  Dose: 800 mcg     latanoprost 0.005 % Soln  Commonly known as: XALATAN   Administer 1 Drop into both eyes every evening. Indications: Wide-Angle Glaucoma  Dose: 1 Drop     levothyroxine 100 MCG Tabs  Commonly known as: SYNTHROID   Take 100 mcg by mouth every morning on an empty stomach.  Dose: 100 mcg     lisinopril 20 MG Tabs  Commonly known as: PRINIVIL   Take 20 mg by mouth every day. Indications: High Blood Pressure Disorder  Dose: 20 mg     ondansetron 4 MG Tbdp  Commonly known as: ZOFRAN ODT   Take 4 mg by mouth every 6 hours as  needed for Nausea. Indications: Nausea and Vomiting  Dose: 4 mg     oxyCODONE immediate-release 5 MG Tabs  Commonly known as: ROXICODONE   Take 5 mg by mouth every 6 hours as needed for Severe Pain.  Dose: 5 mg     ROPINIRole 0.5 MG Tabs  Commonly known as: REQUIP   Take 0.5 mg by mouth at bedtime. Indications: Restless Leg Syndrome  Dose: 0.5 mg     timolol 0.5 % Soln  Commonly known as: TIMOPTIC   Administer 1 Drop into both eyes 2 times a day.  Dose: 1 Drop     traMADol 50 MG Tabs  Commonly known as: ULTRAM   Take 50 mg by mouth every 6 hours as needed for Moderate Pain.  Dose: 50 mg     vitamin D3 1000 Unit (25 mcg) Tabs  Commonly known as: cholecalciferol   Take 1,000 Units by mouth every day.  Dose: 1,000 Units        STOP taking these medications    amoxicillin-clavulanate 875-125 MG Tabs  Commonly known as: AUGMENTIN     multivitamin Tabs            Allergies  No Known Allergies    DIET  Orders Placed This Encounter   Procedures   • Diet Order Diet: Cardiac     Standing Status:   Standing     Number of Occurrences:   1     Order Specific Question:   Diet:     Answer:   Cardiac [6]       ACTIVITY  As tolerated.  Weight bearing as tolerated    CONSULTATIONS  None    PROCEDURES  None    LABORATORY  Lab Results   Component Value Date    SODIUM 139 12/01/2021    POTASSIUM 3.9 12/01/2021    CHLORIDE 102 12/01/2021    CO2 25 12/01/2021    GLUCOSE 97 12/01/2021    BUN 13 12/01/2021    CREATININE 0.56 12/01/2021        Lab Results   Component Value Date    WBC 2.9 (L) 12/01/2021    HEMOGLOBIN 8.4 (L) 12/01/2021    HEMATOCRIT 27.0 (L) 12/01/2021    PLATELETCT 175 12/01/2021        Total time of the discharge process exceeds 36 minutes.

## 2021-12-01 NOTE — ASSESSMENT & PLAN NOTE
-Left hip fracture on 11/7/2021 status post hip surgical repair over at [facility].  She was admitted to [facility] in [place], [place], where she has been rehabbing postoperatively.  -Order PT/OT in the morning.  -Pain control with oxycodone ordered.  -She does have history of right hip fracture status post repair 5 years ago.  -At baseline walks with a walker.  -Very independent was living alone at her house up until age of 89 and driving.  She now lives at [facility] in [facility].

## 2021-12-01 NOTE — ASSESSMENT & PLAN NOTE
Inpatient status on medical floor.  This is Sepsis Present on admission  SIRS criteria identified on my evaluation include: Tachypnea, with respirations greater than 20 per minute and Leukopenia, with WBC less than 4,000  Source is COVID Pneumonia  Sepsis protocol initiated  Fluid resuscitation restricted given positive COVID test  IV antibiotics as appropriate for source of sepsis: Given empiric antibiotic coverage on admission.  She has a CT angiogram of the chest pending.  Noticed that procalcitonin is within normal limits, will follow up on imaging to decide if continuing antibiotics, considering that her chest x-ray showing more right lower lobe infiltrate concerning for pneumonia, possible component of aspiration  While organ dysfunction may be noted elsewhere in this problem list or in the chart, degree of organ dysfunction does not meet CMS criteria for severe sepsis

## 2021-12-06 LAB
BACTERIA BLD CULT: NORMAL
BACTERIA BLD CULT: NORMAL
SIGNIFICANT IND 70042: NORMAL
SIGNIFICANT IND 70042: NORMAL
SITE SITE: NORMAL
SITE SITE: NORMAL
SOURCE SOURCE: NORMAL
SOURCE SOURCE: NORMAL